# Patient Record
(demographics unavailable — no encounter records)

---

## 2024-10-28 NOTE — ASSESSMENT
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 Laird, followed by left myringotomy, was unable to place tube at that time.  Exam today shows intact left tympanic membrane with tympanosclerosis changes, thickening inferiorly at likely site of prior fat graft placement.  Right tympanic membrane patent dry PE tube.  I reviewed prior temporal bone CT images which shows opacification of left middle ear.  I reviewed prior MRI images and the report which shows no restricted diffusion in the left middle ear that would be concerning for cholesteatoma.  I reviewed audiogram today which shows normal hearing bilaterally.  No clinical evidence of cholesteatoma on today's exam involving left ear.  Monitor right ear for tube extrusion, follow-up 6 months, maintain dry ear precautions right ear.

## 2025-01-15 NOTE — REASON FOR VISIT
[Subsequent Evaluation] : a subsequent evaluation for [Parent] : parent [FreeTextEntry2] : Adenoidectomy, Right myringotomy w/ tube, L myringotomy

## 2025-01-15 NOTE — ASSESSMENT
[FreeTextEntry1] : 10y/o female s/p Adenoidectomy, Right myringotomy w/ tube, L myringotomy 5/14 who has been doing well. She saw Dr. Acevedo who did not feel there was any evidence of cholesteatoma  - clinically her right tube looks good and left ear actually looks significantly better  - Audio 10/2024 WNL with patent right tube and left type A tymp -F/u in 3-4 months

## 2025-01-15 NOTE — END OF VISIT
[FreeTextEntry3] : I personally saw and examined Ms. DANY HAMPTON in detail this visit today. I personally reviewed the HPI, PMH, FH, SH, ROS and medications/allergies. I have spoken to VARGAS Montano regarding the history and have personally determined the assessment and plan of care, and documented this myself. I was present and participated in all key portions of the encounter and all procedures noted above. I have made changes in the body of the note where appropriate.   Attesting Faculty: See Attending Signature Below

## 2025-01-15 NOTE — HISTORY OF PRESENT ILLNESS
[de-identified] : 8y/o female s/p Adenoidectomy, Right myringotomy w/ tube, L myringotomy 5/14 who is here for f/u. She states she had some pain after the surgery but now the pain has improved. Her nasal congestion has improved. She feels her left ear feels better. She had CT temporal bones and showed some opacification of left middle ear and discussed with Dr. Acevedo and MRI was ordered with [facility] to r/o cholesteatoma. She has this scheduled for later in the month. [FreeTextEntry1] : She is here for f/u. She has been doing well. She saw Dr. Acevedo who did not feel there was any evidence of cholesteatoma after further imaging with MRI and physical exam and audio. Pt denies any ear drainage, tinnitus or hearing loss and mother notes she has been doing well with no infections.

## 2025-01-15 NOTE — PHYSICAL EXAM
[Normal] : mucosa is normal [Midline] : trachea located in midline position [de-identified] : Right tube in place patent  w/ no drainage, Left TM intact with scar tissue noted along inferior posterior quadrant and now aerated  [de-identified] : 1-2+

## 2025-05-07 NOTE — PHYSICAL EXAM
[Normal] : mucosa is normal [Midline] : trachea located in midline position [de-identified] : Right tube in place patent  w/ no drainage working its way out, Left TM intact with scar tissue noted along inferior posterior quadrant and now aerated  [de-identified] : 1-2+

## 2025-05-07 NOTE — ASSESSMENT
[FreeTextEntry1] : 9y/o female s/p Adenoidectomy, Right myringotomy w/ tube, L myringotomy 5/14 who has been doing well. She saw Dr. Acevedo who did not feel there was any evidence of cholesteatoma  - clinically her right tube looks like it is working its way out and left ear is stable in appearance - Last Audio 10/2024 WNL with patent right tube and left type A tymp-due for audio in October or sooner if tube falls out -F/u in 3-4 months

## 2025-05-07 NOTE — HISTORY OF PRESENT ILLNESS
[de-identified] : 10 y/o female s/p Adenoidectomy, Right myringotomy w/ tube, L myringotomy 5/14 who is here for f/u. She states she had some pain after the surgery but now the pain has improved. Her nasal congestion has improved. She feels her left ear feels better. She had CT temporal bones and showed some opacification of left middle ear and discussed with Dr. Acevedo and MRI was ordered with [facility] to r/o cholesteatoma. She has this scheduled for later in the month. [FreeTextEntry1] : She is here for f/u. She has been doing well. She saw Dr. cAevedo who did not feel there was any evidence of cholesteatoma after further imaging with MRI and physical exam and audio. Pt denies any ear drainage, tinnitus or hearing loss and mother notes she has been doing well with no infections.